# Patient Record
Sex: MALE | Race: WHITE | NOT HISPANIC OR LATINO | URBAN - METROPOLITAN AREA
[De-identification: names, ages, dates, MRNs, and addresses within clinical notes are randomized per-mention and may not be internally consistent; named-entity substitution may affect disease eponyms.]

---

## 2023-06-22 ENCOUNTER — APPOINTMENT (RX ONLY)
Dept: URBAN - METROPOLITAN AREA CLINIC 341 | Facility: CLINIC | Age: 33
Setting detail: DERMATOLOGY
End: 2023-06-22

## 2023-06-22 ENCOUNTER — NEW PATIENT COMPREHENSIVE (OUTPATIENT)
Dept: URBAN - METROPOLITAN AREA CLINIC 22 | Facility: CLINIC | Age: 33
End: 2023-06-22

## 2023-06-22 DIAGNOSIS — H01.113: ICD-10-CM

## 2023-06-22 DIAGNOSIS — H01.116: ICD-10-CM

## 2023-06-22 DIAGNOSIS — L30.9 DERMATITIS, UNSPECIFIED: ICD-10-CM | Status: INADEQUATELY CONTROLLED

## 2023-06-22 PROCEDURE — ? COUNSELING

## 2023-06-22 PROCEDURE — ? PRESCRIPTION MEDICATION MANAGEMENT

## 2023-06-22 PROCEDURE — ? ORDER TESTS

## 2023-06-22 PROCEDURE — 99204 OFFICE O/P NEW MOD 45 MIN: CPT

## 2023-06-22 PROCEDURE — 92004 COMPRE OPH EXAM NEW PT 1/>: CPT

## 2023-06-22 PROCEDURE — ? PRESCRIPTION

## 2023-06-22 RX ORDER — KETOCONAZOLE 20 MG/ML
SHAMPOO, SUSPENSION TOPICAL
Qty: 240 | Refills: 5 | Status: ERX | COMMUNITY
Start: 2023-06-22

## 2023-06-22 RX ORDER — HYDROCORTISONE 25 MG/ML
LOTION TOPICAL
Qty: 118 | Refills: 2 | Status: ERX | COMMUNITY
Start: 2023-06-22

## 2023-06-22 RX ORDER — CEPHALEXIN 500 MG/1
TABLET ORAL
Qty: 20 | Refills: 0 | Status: ERX | COMMUNITY
Start: 2023-06-22

## 2023-06-22 RX ADMIN — KETOCONAZOLE: 20 SHAMPOO, SUSPENSION TOPICAL at 00:00

## 2023-06-22 RX ADMIN — HYDROCORTISONE: 25 LOTION TOPICAL at 00:00

## 2023-06-22 RX ADMIN — CEPHALEXIN: 500 TABLET ORAL at 00:00

## 2023-06-22 ASSESSMENT — LOCATION SIMPLE DESCRIPTION DERM
LOCATION SIMPLE: RIGHT INFERIOR EYELID
LOCATION SIMPLE: LEFT SUPERIOR EYELID
LOCATION SIMPLE: LEFT INFERIOR EYELID
LOCATION SIMPLE: RIGHT SUPERIOR EYELID
LOCATION SIMPLE: LEFT EAR

## 2023-06-22 ASSESSMENT — LOCATION DETAILED DESCRIPTION DERM
LOCATION DETAILED: LEFT SUPERIOR HELIX
LOCATION DETAILED: RIGHT LATERAL SUPERIOR EYELID
LOCATION DETAILED: LEFT CRUS OF HELIX
LOCATION DETAILED: LEFT MEDIAL SUPERIOR EYELID
LOCATION DETAILED: RIGHT LATERAL INFERIOR EYELID
LOCATION DETAILED: LEFT MEDIAL INFERIOR PRESEPTAL REGION

## 2023-06-22 ASSESSMENT — LOCATION ZONE DERM
LOCATION ZONE: EAR
LOCATION ZONE: EYELID

## 2023-06-22 ASSESSMENT — TONOMETRY
OD_IOP_MMHG: 12
OS_IOP_MMHG: 12

## 2023-06-22 ASSESSMENT — VISUAL ACUITY
OS_SC: 20/20
OD_SC: 20/20

## 2023-06-22 NOTE — PROCEDURE: PRESCRIPTION MEDICATION MANAGEMENT
Initiate Treatment: hydrocortisone 2.5 % lotion - Apply to the affected area around the eyes and left ear 2x daily x 2 weeks then every other day for one week then as needed for flares\\n\\nketoconazole 2 % shampoo - Lather to the face and ears once daily x 2 weeks,then 2-3 x weekly for maintenance
Render In Strict Bullet Format?: No
Detail Level: Zone

## 2023-06-22 NOTE — PROCEDURE: ORDER TESTS
Billing Type: Third-Party Bill
Expected Date Of Service: 06/22/2023
Lab Facility: [id number]
Bill For Surgical Tray: no
Performing Laboratory: [id number]

## 2023-08-14 ENCOUNTER — APPOINTMENT (RX ONLY)
Dept: URBAN - METROPOLITAN AREA CLINIC 341 | Facility: CLINIC | Age: 33
Setting detail: DERMATOLOGY
End: 2023-08-14

## 2023-08-14 DIAGNOSIS — A49.02 METHICILLIN RESISTANT STAPHYLOCOCCUS AUREUS INFECTION, UNSPECIFIED SITE: ICD-10-CM

## 2023-08-14 PROCEDURE — ? COUNSELING

## 2023-08-14 PROCEDURE — 99213 OFFICE O/P EST LOW 20 MIN: CPT

## 2023-08-14 PROCEDURE — ? PRESCRIPTION MEDICATION MANAGEMENT

## 2023-08-14 PROCEDURE — ? PRESCRIPTION

## 2023-08-14 RX ORDER — GENTAMICIN SULFATE 1 MG/G
CREAM TOPICAL
Qty: 30 | Refills: 1 | Status: ERX | COMMUNITY
Start: 2023-08-14

## 2023-08-14 RX ADMIN — GENTAMICIN SULFATE: 1 CREAM TOPICAL at 00:00

## 2023-08-14 ASSESSMENT — LOCATION DETAILED DESCRIPTION DERM
LOCATION DETAILED: LEFT ANTITRAGUS
LOCATION DETAILED: RIGHT CENTRAL EYEBROW
LOCATION DETAILED: RIGHT CRUS OF HELIX

## 2023-08-14 ASSESSMENT — LOCATION ZONE DERM
LOCATION ZONE: EAR
LOCATION ZONE: FACE

## 2023-08-14 ASSESSMENT — LOCATION SIMPLE DESCRIPTION DERM
LOCATION SIMPLE: RIGHT EYEBROW
LOCATION SIMPLE: RIGHT EAR
LOCATION SIMPLE: LEFT EAR

## 2023-08-14 NOTE — PROCEDURE: PRESCRIPTION MEDICATION MANAGEMENT
Plan: Hibiclens - wash every other day
Initiate Treatment: gentamicin 0.1 % topical cream - Apply once daily to affected areas of ears x2 weeks, then as needed for flares
Continue Regimen: Ketoconazole shampoo - lather to affected areas, let sit for 3-5 minutes, then rinse. Every other day until it is cleared
Detail Level: Zone
Render In Strict Bullet Format?: No

## 2023-09-26 ENCOUNTER — APPOINTMENT (RX ONLY)
Dept: URBAN - METROPOLITAN AREA CLINIC 341 | Facility: CLINIC | Age: 33
Setting detail: DERMATOLOGY
End: 2023-09-26

## 2023-09-26 DIAGNOSIS — A49.02 METHICILLIN RESISTANT STAPHYLOCOCCUS AUREUS INFECTION, UNSPECIFIED SITE: ICD-10-CM | Status: IMPROVED

## 2023-09-26 DIAGNOSIS — L21.8 OTHER SEBORRHEIC DERMATITIS: ICD-10-CM | Status: INADEQUATELY CONTROLLED

## 2023-09-26 PROCEDURE — 99214 OFFICE O/P EST MOD 30 MIN: CPT

## 2023-09-26 PROCEDURE — ? PRESCRIPTION MEDICATION MANAGEMENT

## 2023-09-26 PROCEDURE — ? COUNSELING

## 2023-09-26 ASSESSMENT — LOCATION SIMPLE DESCRIPTION DERM
LOCATION SIMPLE: LEFT EAR
LOCATION SIMPLE: RIGHT CHEEK
LOCATION SIMPLE: GLABELLA
LOCATION SIMPLE: LEFT CHEEK
LOCATION SIMPLE: RIGHT EYEBROW
LOCATION SIMPLE: RIGHT EAR

## 2023-09-26 ASSESSMENT — LOCATION DETAILED DESCRIPTION DERM
LOCATION DETAILED: RIGHT INFERIOR CENTRAL MALAR CHEEK
LOCATION DETAILED: GLABELLA
LOCATION DETAILED: RIGHT CENTRAL EYEBROW
LOCATION DETAILED: LEFT ANTITRAGUS
LOCATION DETAILED: LEFT CENTRAL MALAR CHEEK
LOCATION DETAILED: RIGHT CRUS OF HELIX
LOCATION DETAILED: LEFT CRUS OF HELIX
LOCATION DETAILED: RIGHT TRAGUS

## 2023-09-26 ASSESSMENT — LOCATION ZONE DERM
LOCATION ZONE: FACE
LOCATION ZONE: EAR

## 2023-09-26 NOTE — PROCEDURE: PRESCRIPTION MEDICATION MANAGEMENT
Plan: Hibiclens - wash every other day
Discontinue Regimen: Gentamicin unless reflares
Detail Level: Zone
Render In Strict Bullet Format?: No
Continue Regimen: C.w Ketoconazole shampoo daily leaving on for 3-5 mins then rinse well.\\nHydrocortisone twice a day x two weeks then as needed for flares

## 2023-10-18 ENCOUNTER — RX ONLY (OUTPATIENT)
Age: 33
Setting detail: RX ONLY
End: 2023-10-18

## 2023-10-18 RX ORDER — KETOCONAZOLE 20 MG/ML
SHAMPOO, SUSPENSION TOPICAL
Qty: 240 | Refills: 5 | Status: ERX

## 2023-10-18 RX ORDER — HYDROCORTISONE 25 MG/ML
LOTION TOPICAL
Qty: 118 | Refills: 3 | Status: ERX

## 2024-03-25 ENCOUNTER — RX ONLY (OUTPATIENT)
Age: 34
Setting detail: RX ONLY
End: 2024-03-25

## 2024-03-25 ENCOUNTER — APPOINTMENT (RX ONLY)
Dept: URBAN - METROPOLITAN AREA CLINIC 341 | Facility: CLINIC | Age: 34
Setting detail: DERMATOLOGY
End: 2024-03-25

## 2024-03-25 DIAGNOSIS — L82.1 OTHER SEBORRHEIC KERATOSIS: ICD-10-CM

## 2024-03-25 DIAGNOSIS — A49.02 METHICILLIN RESISTANT STAPHYLOCOCCUS AUREUS INFECTION, UNSPECIFIED SITE: ICD-10-CM | Status: INADEQUATELY CONTROLLED

## 2024-03-25 DIAGNOSIS — L81.4 OTHER MELANIN HYPERPIGMENTATION: ICD-10-CM

## 2024-03-25 DIAGNOSIS — D18.0 HEMANGIOMA: ICD-10-CM

## 2024-03-25 DIAGNOSIS — L30.4 ERYTHEMA INTERTRIGO: ICD-10-CM

## 2024-03-25 DIAGNOSIS — L21.8 OTHER SEBORRHEIC DERMATITIS: ICD-10-CM | Status: STABLE

## 2024-03-25 DIAGNOSIS — D22 MELANOCYTIC NEVI: ICD-10-CM

## 2024-03-25 PROBLEM — D18.01 HEMANGIOMA OF SKIN AND SUBCUTANEOUS TISSUE: Status: ACTIVE | Noted: 2024-03-25

## 2024-03-25 PROBLEM — D22.5 MELANOCYTIC NEVI OF TRUNK: Status: ACTIVE | Noted: 2024-03-25

## 2024-03-25 PROCEDURE — ? OTC TREATMENT REGIMEN

## 2024-03-25 PROCEDURE — 99214 OFFICE O/P EST MOD 30 MIN: CPT

## 2024-03-25 PROCEDURE — ? COUNSELING

## 2024-03-25 PROCEDURE — ? PRESCRIPTION

## 2024-03-25 PROCEDURE — ? ADDITIONAL NOTES

## 2024-03-25 PROCEDURE — ? PRESCRIPTION MEDICATION MANAGEMENT

## 2024-03-25 RX ORDER — GENTAMICIN SULFATE 1 MG/G
CREAM TOPICAL
Qty: 30 | Refills: 2 | Status: ERX

## 2024-03-25 RX ORDER — KETOCONAZOLE 20 MG/ML
SHAMPOO, SUSPENSION TOPICAL
Qty: 240 | Refills: 5 | Status: ERX

## 2024-03-25 RX ORDER — NYSTATIN 100000 [USP'U]/G
POWDER TOPICAL
Qty: 60 | Refills: 6 | Status: ERX | COMMUNITY
Start: 2024-03-25

## 2024-03-25 RX ORDER — HYDROCORTISONE 25 MG/ML
LOTION TOPICAL
Qty: 118 | Refills: 3 | Status: ERX | COMMUNITY
Start: 2024-03-25

## 2024-03-25 RX ADMIN — NYSTATIN: 100000 POWDER TOPICAL at 00:00

## 2024-03-25 ASSESSMENT — LOCATION SIMPLE DESCRIPTION DERM
LOCATION SIMPLE: RIGHT EAR
LOCATION SIMPLE: RIGHT CHEEK
LOCATION SIMPLE: GLABELLA
LOCATION SIMPLE: LEFT EAR
LOCATION SIMPLE: GROIN
LOCATION SIMPLE: RIGHT EYEBROW
LOCATION SIMPLE: ABDOMEN
LOCATION SIMPLE: LEFT CHEEK

## 2024-03-25 ASSESSMENT — LOCATION DETAILED DESCRIPTION DERM
LOCATION DETAILED: RIGHT INFERIOR CENTRAL MALAR CHEEK
LOCATION DETAILED: RIGHT CRUS OF HELIX
LOCATION DETAILED: PERIUMBILICAL SKIN
LOCATION DETAILED: SUBXIPHOID
LOCATION DETAILED: EPIGASTRIC SKIN
LOCATION DETAILED: RIGHT TRAGUS
LOCATION DETAILED: LEFT CRUS OF HELIX
LOCATION DETAILED: LEFT CENTRAL MALAR CHEEK
LOCATION DETAILED: SUPRAPUBIC SKIN
LOCATION DETAILED: GLABELLA
LOCATION DETAILED: RIGHT CENTRAL EYEBROW
LOCATION DETAILED: LEFT ANTITRAGUS

## 2024-03-25 ASSESSMENT — LOCATION ZONE DERM
LOCATION ZONE: TRUNK
LOCATION ZONE: EAR
LOCATION ZONE: FACE

## 2024-03-25 NOTE — PROCEDURE: PRESCRIPTION MEDICATION MANAGEMENT
Continue Regimen: C.w Ketoconazole shampoo daily leaving on for 3-5 mins then rinse well.\\nHydrocortisone twice a day x two weeks then as needed for flares
Detail Level: Zone
Render In Strict Bullet Format?: No
Plan: Hibiclens - wash every other day
Discontinue Regimen: Gentamicin unless reflares

## 2024-03-25 NOTE — PROCEDURE: ADDITIONAL NOTES
Additional Notes: TM advises to keep the skin moisturized daily with Cetaphil
Render Risk Assessment In Note?: no
Detail Level: Detailed

## (undated) RX ORDER — LOTEPREDNOL ETABONATE 5 MG/G: 1/2 OINTMENT OPHTHALMIC